# Patient Record
Sex: MALE | Race: WHITE
[De-identification: names, ages, dates, MRNs, and addresses within clinical notes are randomized per-mention and may not be internally consistent; named-entity substitution may affect disease eponyms.]

---

## 2018-03-07 ENCOUNTER — HOSPITAL ENCOUNTER (EMERGENCY)
Dept: HOSPITAL 17 - PHED | Age: 23
Discharge: HOME | End: 2018-03-07
Payer: SELF-PAY

## 2018-03-07 VITALS
SYSTOLIC BLOOD PRESSURE: 136 MMHG | DIASTOLIC BLOOD PRESSURE: 82 MMHG | OXYGEN SATURATION: 100 % | RESPIRATION RATE: 18 BRPM | HEART RATE: 103 BPM

## 2018-03-07 VITALS — BODY MASS INDEX: 15.06 KG/M2 | HEIGHT: 65 IN | WEIGHT: 90.39 LBS

## 2018-03-07 VITALS
HEART RATE: 100 BPM | OXYGEN SATURATION: 99 % | RESPIRATION RATE: 18 BRPM | DIASTOLIC BLOOD PRESSURE: 89 MMHG | SYSTOLIC BLOOD PRESSURE: 135 MMHG

## 2018-03-07 VITALS
SYSTOLIC BLOOD PRESSURE: 160 MMHG | TEMPERATURE: 98.1 F | OXYGEN SATURATION: 99 % | DIASTOLIC BLOOD PRESSURE: 71 MMHG | HEART RATE: 116 BPM | RESPIRATION RATE: 18 BRPM

## 2018-03-07 DIAGNOSIS — F90.9: ICD-10-CM

## 2018-03-07 DIAGNOSIS — R25.1: Primary | ICD-10-CM

## 2018-03-07 LAB
ALBUMIN SERPL-MCNC: 4.7 GM/DL (ref 3.4–5)
ALP SERPL-CCNC: 65 U/L (ref 45–117)
ALT SERPL-CCNC: 106 U/L (ref 9–52)
AST SERPL-CCNC: 36 U/L (ref 15–39)
BASOPHILS # BLD AUTO: 0.1 TH/MM3 (ref 0–0.2)
BASOPHILS NFR BLD: 0.9 % (ref 0–2)
BILIRUB SERPL-MCNC: 0.3 MG/DL (ref 0.2–1)
BUN SERPL-MCNC: 9 MG/DL (ref 7–18)
CALCIUM SERPL-MCNC: 9 MG/DL (ref 8.5–10.1)
CHLORIDE SERPL-SCNC: 103 MEQ/L (ref 98–107)
COLOR UR: YELLOW
CREAT SERPL-MCNC: 1 MG/DL (ref 0.6–1.3)
EOSINOPHIL # BLD: 0.1 TH/MM3 (ref 0–0.4)
EOSINOPHIL NFR BLD: 2.1 % (ref 0–4)
ERYTHROCYTE [DISTWIDTH] IN BLOOD BY AUTOMATED COUNT: 11.5 % (ref 11.6–17.2)
GFR SERPLBLD BASED ON 1.73 SQ M-ARVRAT: 95 ML/MIN (ref 89–?)
GLUCOSE SERPL-MCNC: 97 MG/DL (ref 74–106)
GLUCOSE UR STRIP-MCNC: (no result) MG/DL
HCO3 BLD-SCNC: 28.6 MEQ/L (ref 21–32)
HCT VFR BLD CALC: 48.4 % (ref 39–51)
HGB BLD-MCNC: 16 GM/DL (ref 13–17)
HGB UR QL STRIP: (no result)
KETONES UR STRIP-MCNC: (no result) MG/DL
LYMPHOCYTES # BLD AUTO: 1.8 TH/MM3 (ref 1–4.8)
LYMPHOCYTES NFR BLD AUTO: 28.4 % (ref 9–44)
MCH RBC QN AUTO: 31 PG (ref 27–34)
MCHC RBC AUTO-ENTMCNC: 33 % (ref 32–36)
MCV RBC AUTO: 93.7 FL (ref 80–100)
MONOCYTE #: 0.4 TH/MM3 (ref 0–0.9)
MONOCYTES NFR BLD: 6.7 % (ref 0–8)
NEUTROPHILS # BLD AUTO: 4 TH/MM3 (ref 1.8–7.7)
NEUTROPHILS NFR BLD AUTO: 61.9 % (ref 16–70)
NITRITE UR QL STRIP: (no result)
PLATELET # BLD: 357 TH/MM3 (ref 150–450)
PMV BLD AUTO: 7 FL (ref 7–11)
PROT SERPL-MCNC: 8.9 GM/DL (ref 6.4–8.2)
RBC # BLD AUTO: 5.16 MIL/MM3 (ref 4.5–5.9)
RBC #/AREA URNS HPF: (no result) /HPF (ref 0–3)
SODIUM SERPL-SCNC: 139 MEQ/L (ref 136–145)
SP GR UR STRIP: 1 (ref 1–1.03)
SQUAMOUS #/AREA URNS HPF: (no result) /HPF (ref 0–5)
URINE LEUKOCYTE ESTERASE: (no result)
WBC # BLD AUTO: 6.4 TH/MM3 (ref 4–11)

## 2018-03-07 PROCEDURE — 80053 COMPREHEN METABOLIC PANEL: CPT

## 2018-03-07 PROCEDURE — 81001 URINALYSIS AUTO W/SCOPE: CPT

## 2018-03-07 PROCEDURE — 96374 THER/PROPH/DIAG INJ IV PUSH: CPT

## 2018-03-07 PROCEDURE — 84443 ASSAY THYROID STIM HORMONE: CPT

## 2018-03-07 PROCEDURE — 85025 COMPLETE CBC W/AUTO DIFF WBC: CPT

## 2018-03-07 PROCEDURE — 70450 CT HEAD/BRAIN W/O DYE: CPT

## 2018-03-07 PROCEDURE — 99284 EMERGENCY DEPT VISIT MOD MDM: CPT

## 2018-03-07 NOTE — PD
HPI


Chief Complaint:  Neuro Symptoms/ Deficits


Time Seen by Provider:  14:46


Travel History


International Travel<30 days:  No


Contact w/Intl Traveler<30days:  No


Traveled to known affect area:  No





History of Present Illness


HPI


This is a 22-year-old male who presented to the ER complaining of shaking the 

right arm since this morning.  Patient was at the class and suddenly started 

having shaking on his right arm, there is no weakness or sensory loss anywhere 

in his body there is no trauma.  Patient also denies any stress denies 

palpitations no headache no blurred vision or chest pain or shortness of 

breath.  Patient states there is no errors to move his arms or legs, there is 

no unpleasant sensation and states that it only bothers him that he cannot 

control it.





PFSH


Past Medical History


ADHD:  Yes


Influenza Vaccination:  No





Past Surgical History


Surgical History:  No Previous Surgery





Social History


Alcohol Use:  No


Tobacco Use:  No


Substance Use:  No





Allergies-Medications


(Allergen,Severity, Reaction):  


Coded Allergies:  


     No Known Allergies (Unverified  Adverse Reaction, Unknown, 1/15/18)


Reported Meds & Prescriptions





Reported Meds & Active Scripts


Active


Concerta (Methylphenidate HCl) 54 Mg Az 54 Mg PO DAILY








Review of Systems


Except as stated in HPI:  all other systems reviewed are Neg





Physical Exam


Narrative


GENERAL: Alert oriented 3 no acute distress


SKIN: Focused skin assessment warm/dry.


HEAD: Atraumatic. Normocephalic. 


EYES: Pupils equal and round. No scleral icterus. No injection or drainage. 


ENT: No nasal bleeding or discharge.  Mucous membranes pink and moist.


NECK: Trachea midline. No JVD. 


CARDIOVASCULAR: Regular rate and rhythm.  No murmur appreciated.


RESPIRATORY: No accessory muscle use. Clear to auscultation. Breath sounds 

equal bilaterally. 


GASTROINTESTINAL: Abdomen soft, non-tender, nondistended. Hepatic and splenic 

margins not palpable. 


MUSCULOSKELETAL: No obvious deformities. No clubbing.  No cyanosis.  No edema. 


NEUROLOGICAL: Cranial nerves II to XII intact, resting tremors that this 

appears when patient tries to reach an object, no weakness or sensory loss 

reflexes are intact and equal bilaterally,  Motor grossly within normal limits. 

Normal speech and gait.


PSYCHIATRIC: Appropriate mood and affect; insight and judgment normal.





Data


Data


Last Documented VS





Vital Signs








  Date Time  Temp Pulse Resp B/P (MAP) Pulse Ox O2 Delivery O2 Flow Rate FiO2


 


3/7/18 16:29  103 18 136/82 (100) 100 Room Air  


 


3/7/18 14:47 98.1       








Orders





 Orders


Ct Brain W/O Iv Contrast(Rout) (3/7/18 )


Urinalysis - C+S If Indicated (3/7/18 14:58)


Comprehensive Metabolic Panel (3/7/18 14:58)


Complete Blood Count With Diff (3/7/18 14:58)


Thyroid Stimulating Hormone (3/7/18 15:02)


Propranolol (Inderal) (3/7/18 15:15)


Lorazepam Inj (Ativan Inj) (3/7/18 16:30)





Labs





Laboratory Tests








Test


  3/7/18


15:06 3/7/18


15:20


 


White Blood Count 6.4 TH/MM3  


 


Red Blood Count 5.16 MIL/MM3  


 


Hemoglobin 16.0 GM/DL  


 


Hematocrit 48.4 %  


 


Mean Corpuscular Volume 93.7 FL  


 


Mean Corpuscular Hemoglobin 31.0 PG  


 


Mean Corpuscular Hemoglobin


Concent 33.0 % 


  


 


 


Red Cell Distribution Width 11.5 %  


 


Platelet Count 357 TH/MM3  


 


Mean Platelet Volume 7.0 FL  


 


Neutrophils (%) (Auto) 61.9 %  


 


Lymphocytes (%) (Auto) 28.4 %  


 


Monocytes (%) (Auto) 6.7 %  


 


Eosinophils (%) (Auto) 2.1 %  


 


Basophils (%) (Auto) 0.9 %  


 


Neutrophils # (Auto) 4.0 TH/MM3  


 


Lymphocytes # (Auto) 1.8 TH/MM3  


 


Monocytes # (Auto) 0.4 TH/MM3  


 


Eosinophils # (Auto) 0.1 TH/MM3  


 


Basophils # (Auto) 0.1 TH/MM3  


 


CBC Comment DIFF FINAL  


 


Differential Comment   


 


Blood Urea Nitrogen 9 MG/DL  


 


Creatinine 1.00 MG/DL  


 


Random Glucose 97 MG/DL  


 


Total Protein 8.9 GM/DL  


 


Albumin 4.7 GM/DL  


 


Calcium Level 9.0 MG/DL  


 


Alkaline Phosphatase 65 U/L  


 


Aspartate Amino Transf


(AST/SGOT) 36 U/L 


  


 


 


Alanine Aminotransferase


(ALT/SGPT) 106 U/L 


  


 


 


Total Bilirubin 0.3 MG/DL  


 


Sodium Level 139 MEQ/L  


 


Potassium Level 3.5 MEQ/L  


 


Chloride Level 103 MEQ/L  


 


Carbon Dioxide Level 28.6 MEQ/L  


 


Anion Gap 7 MEQ/L  


 


Estimat Glomerular Filtration


Rate 95 ML/MIN 


  


 


 


Urine Collection Type  CLEAN CATCH 


 


Urine Color  YELLOW 


 


Urine Turbidity  CLEAR 


 


Urine pH  6.5 


 


Urine Specific Gravity  1.005 


 


Urine Protein  NEG mg/dL 


 


Urine Glucose (UA)  NEG mg/dL 


 


Urine Ketones  NEG mg/dL 


 


Urine Occult Blood  NEG 


 


Urine Nitrite  NEG 


 


Urine Bilirubin  NEG 


 


Urine Urobilinogen  0.2 MG/DL 


 


Urine Leukocyte Esterase  NEG 


 


Urine RBC  0-3 /hpf 


 


Urine Squamous Epithelial


Cells 


  0-5 /hpf 


 


 


Microscopic Urinalysis Comment


  


  CULT NOT


INDICATED


 


Urine Collection Time  15:20 











[facility]


Medical Decision Making


Medical Screen Exam Complete:  Yes


Emergency Medical Condition:  Yes


Differential Diagnosis


Intracranial abnormality, stress, psychogenic tremors.


Narrative Course


This is a 22-year-old male who presented to the ER complaining of tremors that 

started this morning on his right arm.  On examination there is resting tremors 

that disappears when he tried to reach an object there is no neurological 

deficits.  We tried propranolol in the ER did not help and he tried Ativan with 

minimal improvement.  The fact that is no neurological deficits patient is 

stable to be discharged and he can follow-up with a neurologist to see if any 

further investigations are warranted.  Patient takes Concerta for ADHD and that 

could be an adverse reaction from that.  They gave patient information for her 

neurologist Dr. Mc Husain and encouraged him to follow-up with them.





Diagnosis





 Primary Impression:  


 Resting tremor


Referrals:  


Rodrigue Bentley MD PhD


Disposition:  01 DISCHARGE HOME


Condition:  Stable











Anthony Lay MD Mar 7, 2018 16:40

## 2018-03-07 NOTE — RADRPT
EXAM DATE/TIME:  03/07/2018 15:06 

 

[facility] COMPARISON:     

No previous studies available for comparison.

 

 

INDICATIONS :     

Constant right arm trembling x 4 hours.  Nausea.  Evaluate for cerebrovascular accident. 

                      

 

RADIATION DOSE:     

66.45 CTDIvol (mGy) 

 

 

 

MEDICAL HISTORY :     

None  

 

SURGICAL HISTORY :      

None. 

 

ENCOUNTER:      

Initial

 

ACUITY:      

1 day

 

PAIN SCALE:      

0/10

 

LOCATION:        

cranial 

 

TECHNIQUE:     

Multiple contiguous axial images were obtained of the head.  Using automated exposure control and adj
ustment of the mA and/or kV according to patient size, radiation dose was kept as low as reasonably a
chievable to obtain optimal diagnostic quality images.   DICOM format image data is available electro
nically for review and comparison.  

 

FINDINGS:     

 

CEREBRUM:     

The ventricles are normal for age.  No evidence of midline shift, mass lesion, hemorrhage or acute in
farction.  No extra-axial fluid collections are seen.

 

POSTERIOR FOSSA:     

The cerebellum and brainstem are intact.  The 4th ventricle is midline.  The cerebellopontine angle i
s unremarkable.

 

EXTRACRANIAL:     

The visualized portion of the orbits is intact.

 

SKULL:     

The calvaria is intact.  No evidence of skull fracture.

 

CONCLUSION:     

Negative for acute process.

 

 

 

 Gilberto Sheikh MD FACR on March 07, 2018 at 15:14           

Board Certified Radiologist.

 This report was verified electronically.

## 2018-03-10 ENCOUNTER — HOSPITAL ENCOUNTER (EMERGENCY)
Dept: HOSPITAL 17 - NEPC | Age: 23
Discharge: HOME | End: 2018-03-10
Payer: MEDICAID

## 2018-03-10 VITALS
SYSTOLIC BLOOD PRESSURE: 141 MMHG | OXYGEN SATURATION: 99 % | HEART RATE: 113 BPM | RESPIRATION RATE: 20 BRPM | TEMPERATURE: 97.9 F | DIASTOLIC BLOOD PRESSURE: 75 MMHG

## 2018-03-10 VITALS — RESPIRATION RATE: 16 BRPM

## 2018-03-10 VITALS
DIASTOLIC BLOOD PRESSURE: 68 MMHG | RESPIRATION RATE: 16 BRPM | OXYGEN SATURATION: 99 % | SYSTOLIC BLOOD PRESSURE: 128 MMHG | HEART RATE: 114 BPM

## 2018-03-10 VITALS — WEIGHT: 135.58 LBS | HEIGHT: 64 IN | BODY MASS INDEX: 23.15 KG/M2

## 2018-03-10 DIAGNOSIS — J45.909: ICD-10-CM

## 2018-03-10 DIAGNOSIS — F90.9: ICD-10-CM

## 2018-03-10 DIAGNOSIS — R25.1: Primary | ICD-10-CM

## 2018-03-10 PROCEDURE — 96361 HYDRATE IV INFUSION ADD-ON: CPT

## 2018-03-10 PROCEDURE — 96376 TX/PRO/DX INJ SAME DRUG ADON: CPT

## 2018-03-10 PROCEDURE — 99284 EMERGENCY DEPT VISIT MOD MDM: CPT

## 2018-03-10 PROCEDURE — 96374 THER/PROPH/DIAG INJ IV PUSH: CPT

## 2018-03-10 PROCEDURE — A9579 GAD-BASE MR CONTRAST NOS,1ML: HCPCS

## 2018-03-10 PROCEDURE — 70553 MRI BRAIN STEM W/O & W/DYE: CPT

## 2018-03-10 PROCEDURE — 96375 TX/PRO/DX INJ NEW DRUG ADDON: CPT

## 2018-03-10 NOTE — PD
HPI


Chief Complaint:  Abnormal Results


Time Seen by Provider:  08:44


Travel History


International Travel<30 days:  No


Contact w/Intl Traveler<30days:  No


Traveled to known affect area:  No





History of Present Illness


HPI


Patient's mother gives a history that this onset of right arm shaking occurred 

while he was at work.  [facility] 

[facility] CT head, CMP, CBC, TSH.  Patient was referred to neurology.  Primary care Dr. Quinn from [facility] medicine clinic referred to Dr. Escobedo in [place].  MRI 

of the brain was ordered, looking for a possible mass in the thalamus area.  

This MRI was ordered Friday evening and of course they could not get it done.  

This morning patient woke up with worsening right arm shaking and apparently 

was having some more difficulty walking so the mother was concerned enough to 

bring him to [facility] to get further evaluation.





No known drug allergy


Previous medical history significant for asthma, ADHD, has been vaccinated 

against flu





PFSH


Past Medical History


Hx Anticoagulant Therapy:  No


ADHD:  Yes


Asthma:  No (as a child)


Cardiovascular Problems:  No


Chemotherapy:  No


Cerebrovascular Accident:  No


Diabetes:  No


Respiratory:  No


Influenza Vaccination:  Yes





Past Surgical History


Surgical History:  No Previous Surgery





Social History


Alcohol Use:  No


Tobacco Use:  No


Substance Use:  No





Allergies-Medications


(Allergen,Severity, Reaction):  


Coded Allergies:  


     No Known Allergies (Unverified  Adverse Reaction, Unknown, 3/10/18)


Reported Meds & Prescriptions





Reported Meds & Active Scripts


Active


Concerta (Methylphenidate HCl) 54 Mg Az 54 Mg PO DAILY








Review of Systems


Neurologic:  Positive: Focal Abnormalities (Resting tremor right upper extremity

)





Physical Exam


Narrative


GENERAL: 


SKIN: Warm and dry.


HEAD: Atraumatic. Normocephalic. 


EYES: Pupils equal and round. No scleral icterus. No injection or drainage. 


ENT: No nasal bleeding or discharge.  Mucous membranes pink and moist.


NECK: Trachea midline. No JVD. 


CARDIOVASCULAR: Regular rate and rhythm.  


RESPIRATORY: No accessory muscle use. Clear to auscultation. Breath sounds 

equal bilaterally. 


GASTROINTESTINAL: Abdomen soft, non-tender, nondistended. Hepatic and splenic 

margins not palpable. 


MUSCULOSKELETAL: Extremities without clubbing, cyanosis, or edema. No obvious 

deformities. 


NEUROLOGICAL: Awake and alert. No obvious cranial nerve deficits.  Motor 

grossly within normal limits. Five out of 5 muscle strength in the arms and 

legs.  Normal speech.  Noted was a resting tremor of the right upper extremity 

rhythmic and confined to the right upper extremity primarily flexion at the 

elbow with partial and minimal extension


PSYCHIATRIC: Appropriate mood and affect; insight and judgment normal.





Data


Data


Last Documented VS





Vital Signs








  Date Time  Temp Pulse Resp B/P (MAP) Pulse Ox O2 Delivery O2 Flow Rate FiO2


 


3/10/18 10:05   16     


 


3/10/18 09:02  108   99 Room Air  


 


3/10/18 08:36    128/68 (88)    


 


3/10/18 08:19 97.9       








Orders





 Orders


Mri Brain W&W/O Contrast (3/10/18 08:55)


Lorazepam Inj (Ativan Inj) (3/10/18 09:00)


Ketorolac Inj (Toradol Inj) (3/10/18 09:00)


Sodium Chlor 0.9% 1000 Ml Inj (Ns 1000 M (3/10/18 09:00)


Gadodiamide Pf Inj (Omniscan Pf Inj) (3/10/18 07:59)








MDM


Medical Decision Making


Medical Screen Exam Complete:  Yes


Emergency Medical Condition:  Yes


Medical Record Reviewed:  Yes


Differential Diagnosis


A mass versus malignancy versus thalamic lesion versus internal capsule lesion


Narrative Course











During the March 7 evaluation:


CBC shows no leukocytosis, no anemia, normal platelet count, no evidence of any 

left shift.


TSH was within normal limits, kidney function test within normal limits, 

electrolytes within normal limits, LFTs show an AST of 36 and an ALT of 106 of 

unknown significance there is slight elevation of ALT


UA showed no evidence of any UTI no evidence of any proteinuria or glucosuria.


CT of head was read as negative for acute process by radiology





Diagnosis





 Primary Impression:  


 Resting tremor


Patient Instructions:  General Instructions





***Additional Instructions:  


PLEASE CALL DR ESCOBEDO FOR ADDITIONAL EVALUATION, MRI TODAY DID NOT SHOW ANY 

LESIONS, EVIDENCE OF STROKE, OR BRAIN MASS.


YOU ARE GIVEN BACLOFEN TO HELP WITH MUSCLE SPASMS


Scripts


Baclofen (Baclofen) 20 Mg Tab


20 MG PO TID for Muscle Spasm, #15 TAB 0 Refills


   Prov: Catalino Bernardo MD         3/10/18


Disposition:  01 DISCHARGE HOME


Condition:  Stable











Catalino eBrnardo MD Mar 10, 2018 09:05

## 2018-03-10 NOTE — RADRPT
EXAM DATE/TIME:  03/10/2018 10:35 

 

[facility] COMPARISON:     

No previous studies available for comparison.

       

 

 

INDICATIONS :     

Seizures.

                     

 

CONTRAST:     

16 cc Omniscan (gadodiamide) IV

                     

 

MEDICAL HISTORY :     

None.     

 

SURGICAL HISTORY :     

None.     

 

ENCOUNTER:     

Initial

 

ACUITY:     

1 day

 

PAIN SCORE:     

0/10

 

LOCATION:       

cranial 

 

TECHNIQUE:     

Multiplanar, multisequence MRI of the brain was performed both prior to and following the administrat
ion of paramagnetic contrast.

 

FINDINGS:     

 

CEREBRUM:     

The ventricles are normal for age.  No evidence of midline shift, mass lesion, hemorrhage or acute in
farction.  No extraaxial fluid collections are seen.  The pituitary gland and suprasellar cistern are
 normal in configuration.

 

WHITE MATTER:     

No significant signal abnormalities are seen in the white matter.

 

POSTERIOR FOSSA:     

The cerebellum and brainstem are intact.  The 4th ventricle is midline. The cerebellopontine angle is
 unremarkable.  The cerebellar tonsils are normal in position.

 

DIFFUSION IMAGING:     

No focal areas of restricted diffusion are seen.  No evidence of acute infarction.

 

EXTRACRANIAL:     

The visualized portions of the orbits and paranasal sinuses are unremarkable.

 

POST-CONTRAST:     

No abnormal areas of parenchymal or dural enhancement.  No evidence of blood-brain barrier breakdown.


 

CONCLUSION:     

Normal examination.  

 

No evidence of mesial temporal sclerosis, mass or enhancing lesions.

 

 

 

 Ten La MD on March 10, 2018 at 11:13           

Board Certified Radiologist.

 This report was verified electronically.

## 2018-03-13 ENCOUNTER — HOSPITAL ENCOUNTER (OUTPATIENT)
Dept: HOSPITAL 17 - HEEG | Age: 23
End: 2018-03-13
Payer: MEDICAID

## 2018-03-13 DIAGNOSIS — R25.1: Primary | ICD-10-CM

## 2018-03-13 PROCEDURE — 95819 EEG AWAKE AND ASLEEP: CPT

## 2018-03-14 NOTE — MG
cc:

Gavino Lorenzana MD

****

 

 

ELECTROENCEPHALOGRAM RECORD NUMBER:

.

 

REASON FOR ELECTROENCEPHALOGRAM:

This is a 22-year-old, history of tremulousness.

 

DESCRIPTION:

A 7-8 hertz posterior rhythm, 20-40 microvolts, low amplitude Beta in 

the frontal channels.  Good anterior to posterior gradient.  Right 

arm, right leg tremoring occurring at epoch 31.  Some myogenic 

artifact.  No clear epileptic activity during that time followed by 

some mild right-sided slowing.  Sharply contoured waveform right 

frontal epoch 36.  Masseter, jaw tension.  The patient told to relax. 

Epoch 59, right arm, right leg tremoring.  No epileptic activity noted

at that time.  Also occurring during photic stimulation, a lot of 

myogenic artifact in the right temporal region.  Reduced _____ with 

photic stimulation.  Overall good buildup during hyperventilation.  

Single lead EKG showing sinus rhythm.

 

INTERPRETATION:

Right-sided tremors occurring off and on during the recording.  Did 

not appear to have any epileptic correlation, although there were 

epochs with myogenic artifact occurring, myogenic, some electrical 

artifact, clinical correlation.

 

 

__________________________________

MD GRACE Baez/MEL

D: 03/13/2018, 04:53 PM

T: 03/13/2018, 05:17 PM

Visit #: [id number]

Job #: [id number]

## 2018-03-22 ENCOUNTER — HOSPITAL ENCOUNTER (OUTPATIENT)
Dept: HOSPITAL 17 - HRAD | Age: 23
End: 2018-03-22
Payer: MEDICAID

## 2018-03-22 DIAGNOSIS — R25.9: Primary | ICD-10-CM

## 2018-03-22 PROCEDURE — A9579 GAD-BASE MR CONTRAST NOS,1ML: HCPCS

## 2018-03-22 PROCEDURE — 72156 MRI NECK SPINE W/O & W/DYE: CPT

## 2018-03-22 NOTE — RADRPT
EXAM DATE/TIME:  03/22/2018 13:26 

 

[facility] COMPARISON:     

No previous studies available for comparison.

       

 

 

INDICATIONS :     

Unspecified abnormal involuntary movements.

                     

 

CONTRAST:     

14 cc Omniscan (gadodiamide) IV

                     

 

MEDICAL HISTORY :     

None.     

 

SURGICAL HISTORY :           

Tubes in ears.

 

ENCOUNTER:     

Initial

 

ACUITY:     

2 weeks

 

PAIN SCORE:     

3/10

 

LOCATION:     

Bilateral   neck region.

 

TECHNIQUE:     

Multiplanar, multisequence MRI examination of the cervical spine was performed.

 

FINDINGS:     

 

VERTEBRAE:     

Normal vertebral body height.  Homogeneous marrow signal.

 

ALIGNMENT:     

No evidence of subluxation.

 

CORD:     

Normal configuration and signal.

 

POST FOSSA:     

The cerebellar tonsils are normal in position.

 

POST-CONTRAST:     

No abnormal areas of enhancement are seen.

 

C2-C3:  

The  thecal sac has a normal configuration.  There is no evidence of disc herniation or spinal canal 
stenosis.  The neural foramina are patent bilaterally.

 

C3-C4:  

The thecal sac has a normal configuration.  There is no evidence of disc herniation or spinal canal s
tenosis.  The neural foramina are patent bilaterally.

 

C4-C5:  

The thecal sac has a normal configuration.  There is no evidence of disc herniation or spinal canal s
tenosis.  The neural foramina are patent bilaterally.

 

C5-C6:  

The thecal sac has a normal configuration.  There is no evidence of disc herniation or spinal canal s
tenosis.  The neural foramina are patent bilaterally.

 

C6-C7:  

The thecal sac has a normal configuration.  There is no evidence of disc herniation or spinal canal s
tenosis.  The neural foramina are patent bilaterally.

 

C7-T1:  

The thecal sac has a normal configuration.  There is no evidence of disc herniation or spinal canal s
tenosis.  The neural foramina are patent bilaterally.

 

CONCLUSION:     Normal examination.  

 

 

 

 Gary Caceres MD on March 22, 2018 at 14:48           

Board Certified Radiologist.

 This report was verified electronically.